# Patient Record
Sex: FEMALE | Race: WHITE
[De-identification: names, ages, dates, MRNs, and addresses within clinical notes are randomized per-mention and may not be internally consistent; named-entity substitution may affect disease eponyms.]

---

## 2021-01-18 ENCOUNTER — HOSPITAL ENCOUNTER (EMERGENCY)
Dept: HOSPITAL 63 - ER | Age: 41
LOS: 1 days | Discharge: HOME | End: 2021-01-19
Payer: SELF-PAY

## 2021-01-18 DIAGNOSIS — Z88.0: ICD-10-CM

## 2021-01-18 DIAGNOSIS — F41.9: ICD-10-CM

## 2021-01-18 DIAGNOSIS — L03.011: Primary | ICD-10-CM

## 2021-01-18 DIAGNOSIS — Z88.6: ICD-10-CM

## 2021-01-18 DIAGNOSIS — F17.200: ICD-10-CM

## 2021-01-18 DIAGNOSIS — F31.9: ICD-10-CM

## 2021-01-18 PROCEDURE — 73140 X-RAY EXAM OF FINGER(S): CPT

## 2021-01-18 PROCEDURE — 99283 EMERGENCY DEPT VISIT LOW MDM: CPT

## 2021-01-18 NOTE — PHYS DOC
Past History


Past Medical History:  Anxiety, Bipolar, Other


Past Surgical History:  Tubal ligation


Smoking:  Less than 1pk/day


Alcohol Use:  None


Drug Use:  None





Adult General


HPI


HPI





Patient is a 40-year-old female who presents with a chief complaint of right 

thumb pain.  States that she jammed her finger yesterday while doing something 

and also got a splinter underneath of her thumbnail.  States she pulled the 

splinter out but since then has had right thumb pain, 7 out of 10, sharp in 

nature since then.  Denies any other injuries.  Denies any fevers, chest pain, 

shortness of breath, nausea, vomiting.





Review of Systems


Review of Systems


Review of systems otherwise unremarkable except noted in HPI





Allergies


Allergies





Allergies








Coded Allergies Type Severity Reaction Last Updated Verified


 


  Penicillins Allergy Intermediate  8/12/15 Yes


 


  tramadol Allergy Intermediate  1/22/14 Yes











Physical Exam


Physical Exam





Constitutional: Well developed, well nourished, no acute distress, non-toxic 

appearance. []


HENT: Normocephalic, atraumatic


Eyes: conjunctiva normal, no discharge. [] 


Neck: Normal range of motion, 


Skin: Warm, dry, no erythema, no rash. [] 


Extremities: Patient has tenderness at the distal tip of the right thumb with a 

small amount of swelling but no erythema or warmth.  Patient has a small amount 

of blood under the tip of the thumbnail.  Range of motion normal.  Capillary 

refill normal.  Gross sensation and range of motion normal.


Neurologic: Alert and oriented X 3, normal motor function, normal sensory 

function, no focal deficits noted. []


Psychologic: Affect normal, judgement normal, mood normal. []





EKG


EKG


[]





Radiology/Procedures


Radiology/Procedures


No acute osseous abnormalities, no foreign bodies or gas noted imaging []





Heart Score


Risk Factors:


Risk Factors:  DM, Current or recent (<one month) smoker, HTN, HLP, family 

history of CAD, obesity.


Risk Scores:


Risk Factors:  DM, Current or recent (<one month) smoker, HTN, HLP, family 

history of CAD, obesity.





Course & Med Decision Making


Course & Med Decision Making


Patient is a 40-year-old female presents with right thumb pain after jamming it 

and getting a splinter under the nail


Vital signs not concerning.  Physical exam noted above.  Patient started on 

clindamycin and given Percocet in the ED as she took ibuprofen before coming to 

the ED.


Patient also requesting information to establish care with a primary care 

physician.  Imaging not concerning.  Discussed all findings with patient and 

recommended a pain regimen at home including her prescription pain medication, 

ibuprofen and ice.  Given primary care physician contact information.  Advised 

to call first thing in the morning to try and establish care and set up a 

follow-up visit in the next week or so for a wound check.  Advised to come back 

to the ED with new or concerning symptoms.  Patient grateful, verbalized 

understanding and agreed with plan of discharge.


[]





Dragon Disclaimer


Dragon Disclaimer


This electronic medical record was generated, in whole or in part, using a voice

 recognition dictation system.





Departure


Departure:


Impression:  


   Primary Impression:  


   Pain of right thumb


   Additional Impression:  


   Cellulitis of right thumb


Condition:  GOOD


Referrals:  


PCP,NO (PCP)


Patient Instructions:  Cellulitis, Easy-to-Read


Scripts


Oxycodone HCl/Acetaminophen (Percocet 5-325 mg Tablet) 1 Each Tablet


1 TAB PO PRN BID PRN for thumb pain MDD 2 Tablet(s) for 3 Days, #6 TAB 0 Refills


   Prov: JM MANZO MD         1/19/21 


Clindamycin Hcl (CLINDAMYCIN HCL) 300 Mg Capsule


1 CAP PO TID for cellulitis for 7 Days, #21 CAP


   Prov: JM MANZO MD         1/19/21





Problem Qualifiers











JM MANZO MD               Jan 18, 2021 23:37

## 2021-01-19 VITALS — SYSTOLIC BLOOD PRESSURE: 113 MMHG | DIASTOLIC BLOOD PRESSURE: 73 MMHG

## 2021-01-19 NOTE — RAD
EXAM:  XR FINGER(S)_RIGHT 2+VIEWS 1/19/2021 12:24 AM



CLINICAL INDICATION:  Right thumb injury



COMPARISON:  None



TECHNIQUE:  3 views of the right hand



FINDINGS:  No acute fracture. Alignment is normal. Joint spaces are maintained. No soft tissue abnorm
ality.



IMPRESSION:  No acute osseous abnormality.



Electronically signed by: Susie Barton MD (1/19/2021 2:40 AM) [id number]

## 2021-04-04 ENCOUNTER — HOSPITAL ENCOUNTER (EMERGENCY)
Dept: HOSPITAL 63 - ER | Age: 41
Discharge: HOME | End: 2021-04-04
Payer: SELF-PAY

## 2021-04-04 VITALS — DIASTOLIC BLOOD PRESSURE: 111 MMHG | SYSTOLIC BLOOD PRESSURE: 183 MMHG

## 2021-04-04 VITALS — HEIGHT: 67 IN | BODY MASS INDEX: 38.06 KG/M2 | WEIGHT: 242.51 LBS

## 2021-04-04 DIAGNOSIS — Z88.0: ICD-10-CM

## 2021-04-04 DIAGNOSIS — Z88.8: ICD-10-CM

## 2021-04-04 DIAGNOSIS — F17.210: ICD-10-CM

## 2021-04-04 DIAGNOSIS — F31.9: ICD-10-CM

## 2021-04-04 DIAGNOSIS — Z87.442: ICD-10-CM

## 2021-04-04 DIAGNOSIS — F15.10: ICD-10-CM

## 2021-04-04 DIAGNOSIS — D72.829: ICD-10-CM

## 2021-04-04 DIAGNOSIS — Z87.440: ICD-10-CM

## 2021-04-04 DIAGNOSIS — L03.116: Primary | ICD-10-CM

## 2021-04-04 DIAGNOSIS — N39.0: ICD-10-CM

## 2021-04-04 DIAGNOSIS — F41.9: ICD-10-CM

## 2021-04-04 DIAGNOSIS — F12.10: ICD-10-CM

## 2021-04-04 LAB
AMPHETAMINE/METHAMPHETAMINE: (no result)
ANION GAP SERPL CALC-SCNC: 8 MMOL/L (ref 6–14)
APTT PPP: YELLOW S
BACTERIA #/AREA URNS HPF: (no result) /HPF
BARBITURATES UR-MCNC: (no result) UG/ML
BASOPHILS # BLD AUTO: 0 X10^3/UL (ref 0–0.2)
BASOPHILS NFR BLD: 0 % (ref 0–3)
BENZODIAZ UR-MCNC: (no result) UG/L
BILIRUB UR QL STRIP: (no result)
CA-I SERPL ISE-MCNC: 14 MG/DL (ref 7–20)
CALCIUM SERPL-MCNC: 8.8 MG/DL (ref 8.5–10.1)
CANNABINOIDS UR-MCNC: (no result) UG/L
CHLORIDE SERPL-SCNC: 105 MMOL/L (ref 98–107)
CO2 SERPL-SCNC: 28 MMOL/L (ref 21–32)
COCAINE UR-MCNC: (no result) NG/ML
CREAT SERPL-MCNC: 1 MG/DL (ref 0.6–1)
EOSINOPHIL NFR BLD: 0.3 X10^3/UL (ref 0–0.7)
EOSINOPHIL NFR BLD: 2 % (ref 0–3)
ERYTHROCYTE [DISTWIDTH] IN BLOOD BY AUTOMATED COUNT: 13.3 % (ref 11.5–14.5)
FIBRINOGEN PPP-MCNC: (no result) MG/DL
GFR SERPLBLD BASED ON 1.73 SQ M-ARVRAT: 62.1 ML/MIN
GLUCOSE SERPL-MCNC: 104 MG/DL (ref 70–99)
GLUCOSE UR STRIP-MCNC: (no result) MG/DL
HCT VFR BLD CALC: 40.1 % (ref 36–47)
HGB BLD-MCNC: 13.3 G/DL (ref 12–15.5)
LYMPHOCYTES # BLD: 1.5 X10^3/UL (ref 1–4.8)
LYMPHOCYTES NFR BLD AUTO: 13 % (ref 24–48)
MCH RBC QN AUTO: 29 PG (ref 25–35)
MCHC RBC AUTO-ENTMCNC: 33 G/DL (ref 31–37)
MCV RBC AUTO: 86 FL (ref 79–100)
METHADONE SERPL-MCNC: (no result) NG/ML
MONO #: 1.1 X10^3/UL (ref 0–1.1)
MONOCYTES NFR BLD: 9 % (ref 0–9)
NEUT #: 8.8 X10^3UL (ref 1.8–7.7)
NEUTROPHILS NFR BLD AUTO: 76 % (ref 31–73)
NITRITE UR QL STRIP: (no result)
OPIATES UR-MCNC: (no result) NG/ML
PCP SERPL-MCNC: (no result) MG/DL
PLATELET # BLD AUTO: 288 X10^3/UL (ref 140–400)
POTASSIUM SERPL-SCNC: 3.6 MMOL/L (ref 3.5–5.1)
RBC # BLD AUTO: 4.65 X10^6/UL (ref 3.5–5.4)
SODIUM SERPL-SCNC: 141 MMOL/L (ref 136–145)
SP GR UR STRIP: >=1.03
SQUAMOUS #/AREA URNS LPF: (no result) /LPF
UROBILINOGEN UR-MCNC: 0.2 MG/DL
WBC # BLD AUTO: 11.7 X10^3/UL (ref 4–11)
WBC #/AREA URNS HPF: >40 /HPF (ref 0–4)

## 2021-04-04 PROCEDURE — 81001 URINALYSIS AUTO W/SCOPE: CPT

## 2021-04-04 PROCEDURE — 87040 BLOOD CULTURE FOR BACTERIA: CPT

## 2021-04-04 PROCEDURE — 96365 THER/PROPH/DIAG IV INF INIT: CPT

## 2021-04-04 PROCEDURE — 87591 N.GONORRHOEAE DNA AMP PROB: CPT

## 2021-04-04 PROCEDURE — 85025 COMPLETE CBC W/AUTO DIFF WBC: CPT

## 2021-04-04 PROCEDURE — 81025 URINE PREGNANCY TEST: CPT

## 2021-04-04 PROCEDURE — 80048 BASIC METABOLIC PNL TOTAL CA: CPT

## 2021-04-04 PROCEDURE — 80307 DRUG TEST PRSMV CHEM ANLYZR: CPT

## 2021-04-04 PROCEDURE — 99284 EMERGENCY DEPT VISIT MOD MDM: CPT

## 2021-04-04 PROCEDURE — 87086 URINE CULTURE/COLONY COUNT: CPT

## 2021-04-04 PROCEDURE — 87491 CHLMYD TRACH DNA AMP PROBE: CPT

## 2021-04-04 PROCEDURE — 90715 TDAP VACCINE 7 YRS/> IM: CPT

## 2021-04-04 PROCEDURE — 90471 IMMUNIZATION ADMIN: CPT

## 2021-04-04 PROCEDURE — 36415 COLL VENOUS BLD VENIPUNCTURE: CPT

## 2021-04-04 PROCEDURE — 96375 TX/PRO/DX INJ NEW DRUG ADDON: CPT

## 2021-04-04 NOTE — PHYS DOC
Past History


Past Medical History:  Anxiety, Bipolar, Kidney Stones, STD, UTI


Past Medical History


cellulitis


Past Surgical History:  , Tubal ligation


Past Surgical History


Endometrial ablation


Smoking:  Less than 1pk/day


Alcohol Use:  None


Drug Use:  None





General Adult


HPI:


HPI:


".. I was going up a down escalator.  And scraped his right shin pretty good 

about 4 days ago.... And in the last 24 hours it 's got really infected  ... Red

and swollen... I also have a little bit of a urinary dysuria... And a bad 

fractured tooth here on the right lower.... I usually see Dand[facility].. but this leg 

has gotten very swollen and painful looks like the scrapes areas are starting to

drain.."...





Patient is a 40 year old female who presents with above hx and complaints 

cellulitis of right shin-tibia.  Patient has 4 to centimeter cut marks that are 

inflamed and starting to drain.  Patient has had MRSA before.  Patient has no 

current striation but there is obvious erythema at site of 

abrasions/lacerations.  Patient denies any history of immunosuppression.  No 

history of recent travel.  No specific ill contacts.  Patient has subjective 

complaints also of dysuria.  Patient also fractured molar tooth #31 and having 

continued pain at the site.  Patient has past medical history of several ED 

visits.  For pain complaints.  Past medical history of anxiety, depression, 

endometriosis, bipolar disorder, GERD, MRSA, cellulitis, and STDs.  Patient 

currently requesting antibiotics until she can follow-up with primary care.  

Patient does continue to smoke approximately a pack a day.





Review of Systems:


Review of Systems:


Constitutional:  Denies fever or chills 


Eyes:  Denies change in visual acuity 


HENT:  Denies nasal congestion or sore throat 


Respiratory:  Denies cough or shortness of breath 


Cardiovascular:  Denies chest pain or edema 


GI:  Denies abdominal pain, nausea, vomiting, bloody stools or diarrhea 


: Denies dysuria 


Musculoskeletal:  Denies back pain or joint pain 


Integument:  Denies rash-complains of cellulitis at abrasion site right leg


Neurologic:  Denies headache, focal weakness or sensory changes 


Endocrine:  Denies polyuria or polydipsia 


Lymphatic:  Denies swollen glands 


Psychiatric: History of depression or anxiety





Family History:


Family History:


Noncontributory to presentation





Current Medications:


Current Meds:


See nursing for home meds





Allergies:


Allergies:





Allergies








Coded Allergies Type Severity Reaction Last Updated Verified


 


  Penicillins Allergy Intermediate  8/12/15 Yes


 


  tramadol Allergy Intermediate  14 Yes











Physical Exam:


PE:





Constitutional: In acute distress, non-toxic appearance. []


HENT: Normocephalic, atraumatic, bilateral external ears normal, oropharynx 

moist, no oral exudates, nose normal. []


Eyes: PERRLA, EOMI, conjunctiva normal, no discharge. [] 


Neck: Normal range of motion, no tenderness, supple, no stridor. [] 


Cardiovascular:Heart rate regular rhythm, no murmur []


Lungs & Thorax:  Bilateral breath sounds equal apex with scattered wheezes on 

auscultation []


Abdomen: Bowel sounds normal, soft, no tenderness, no masses, no pulsatile 

masses.  Obese.  Old  surgery scar


Skin: Warm, dry, no erythema, right anterior tibia area abrasion and cellulitis


Back: No tenderness, no CVA tenderness. [] 


Extremities: No tenderness, no cyanosis, no clubbing, ROM intact, right lower 

leg edema. [] 


Neurologic: Alert and oriented X 3, normal motor function, normal sensory 

function, no focal deficits noted. []


Psychologic: Affect n anxious, judgement normal, mood normal. []





EKG:


EKG:


[]





Radiology/Procedures:


Radiology/Procedures:


[]





Heart Score:


C/O Chest Pain:  N/A


Risk Factors:


Risk Factors:  DM, Current or recent (<one month) smoker, HTN, HLP, family 

history of CAD, obesity.


Risk Scores:


Score 0 - 3:  2.5% MACE over next 6 weeks - Discharge Home


Score 4 - 6:  20.3% MACE over next 6 weeks - Admit for Clinical Observation


Score 7 - 10:  72.7% MACE over next 6 weeks - Early Invasive Strategies





Course & Med Decision Making:


Course & Med Decision Making


Pertinent Labs and Imaging studies reviewed. (See chart for details)





Patient currently declines admission for IV antibiotics.  Patient given dose of 

Rocephin 1 g and then started on Bactrim DS to take twice a day.  Patient to use

 warm compresses with salt water and Epson salts 4 times a day to the area of 

cellulitis.  After compresses massage in Polysporin.  Patient monitor site 

closely for any striations or signs of excess increase infection.  Patient 

follow-up pending lab culture.  Patient to push vitamin C drinks.  Patient 

follow-up primary care.











Impression:





1.  Cellulitis right lower leg


2.  Continued tobacco use


3.  Mild leukocytosis 11.7


4.  Drug screen positive for marijuana and amphetamines


5.  Urinary tract infection








[]





Dragon Disclaimer:


Dragon Disclaimer:


This electronic medical record was generated, in whole or in part, using a voice

 recognition dictation system.





Departure


Departure:


Referrals:  


PCP,JAZLYN (PCP)


Scripts


Sulfamethoxazole/Trimethoprim (BACTRIM DS TABLET) 1 Each Tablet


1 TAB PO BID for cellulitis for 10 Days, #20 TAB 0 Refills


   Prov: ANJUM SONG MD         21





Dragon Disclaimer


This chart was dictated in whole or in part using Voice Recognition software in 

a busy, high-work load, and often noisy Emergency Department environment.  It 

may contain unintended and wholly unrecognized errors or omissions.











ANJUM SONG MD            2021 00:48

## 2021-04-05 ENCOUNTER — HOSPITAL ENCOUNTER (INPATIENT)
Dept: HOSPITAL 63 - ER | Age: 41
LOS: 2 days | Discharge: LEFT BEFORE DISCHARGE | DRG: 603 | End: 2021-04-07
Attending: HOSPITALIST | Admitting: HOSPITALIST
Payer: SELF-PAY

## 2021-04-05 VITALS — HEIGHT: 67 IN | WEIGHT: 242.51 LBS | BODY MASS INDEX: 38.06 KG/M2

## 2021-04-05 DIAGNOSIS — F41.1: ICD-10-CM

## 2021-04-05 DIAGNOSIS — Z53.29: ICD-10-CM
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
## 2021-04-05 DIAGNOSIS — Z87.442: ICD-10-CM

## 2021-04-05 DIAGNOSIS — F17.210: ICD-10-CM

## 2021-04-05 DIAGNOSIS — Z98.51: ICD-10-CM

## 2021-04-05 DIAGNOSIS — L03.115: Primary | ICD-10-CM
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
## 2021-04-05 DIAGNOSIS — B95.62: ICD-10-CM
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
## 2021-04-05 DIAGNOSIS — F31.9: ICD-10-CM
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
## 2021-04-05 DIAGNOSIS — Z83.3: ICD-10-CM

## 2021-04-05 PROCEDURE — 80048 BASIC METABOLIC PNL TOTAL CA: CPT

## 2021-04-05 PROCEDURE — 36415 COLL VENOUS BLD VENIPUNCTURE: CPT

## 2021-04-05 PROCEDURE — 85025 COMPLETE CBC W/AUTO DIFF WBC: CPT

## 2021-04-05 PROCEDURE — 96365 THER/PROPH/DIAG IV INF INIT: CPT

## 2021-04-05 PROCEDURE — 86140 C-REACTIVE PROTEIN: CPT

## 2021-04-05 PROCEDURE — 73590 X-RAY EXAM OF LOWER LEG: CPT

## 2021-04-05 PROCEDURE — 83605 ASSAY OF LACTIC ACID: CPT

## 2021-04-05 PROCEDURE — 82565 ASSAY OF CREATININE: CPT

## 2021-04-06 VITALS — DIASTOLIC BLOOD PRESSURE: 70 MMHG | SYSTOLIC BLOOD PRESSURE: 114 MMHG

## 2021-04-06 VITALS — DIASTOLIC BLOOD PRESSURE: 80 MMHG | SYSTOLIC BLOOD PRESSURE: 123 MMHG

## 2021-04-06 VITALS — SYSTOLIC BLOOD PRESSURE: 101 MMHG | DIASTOLIC BLOOD PRESSURE: 64 MMHG

## 2021-04-06 VITALS — DIASTOLIC BLOOD PRESSURE: 64 MMHG | SYSTOLIC BLOOD PRESSURE: 99 MMHG

## 2021-04-06 LAB
ANION GAP SERPL CALC-SCNC: 9 MMOL/L (ref 6–14)
BASOPHILS # BLD AUTO: 0 X10^3/UL (ref 0–0.2)
BASOPHILS NFR BLD: 0 % (ref 0–3)
CA-I SERPL ISE-MCNC: 10 MG/DL (ref 7–20)
CALCIUM SERPL-MCNC: 8.7 MG/DL (ref 8.5–10.1)
CHLORIDE SERPL-SCNC: 102 MMOL/L (ref 98–107)
CO2 SERPL-SCNC: 26 MMOL/L (ref 21–32)
CREAT SERPL-MCNC: 1.1 MG/DL (ref 0.6–1)
CRP SERPL-MCNC: 152.8 MG/L (ref 0–3.3)
EOSINOPHIL NFR BLD: 0.1 X10^3/UL (ref 0–0.7)
EOSINOPHIL NFR BLD: 1 % (ref 0–3)
ERYTHROCYTE [DISTWIDTH] IN BLOOD BY AUTOMATED COUNT: 13.4 % (ref 11.5–14.5)
GFR SERPLBLD BASED ON 1.73 SQ M-ARVRAT: 55.6 ML/MIN
GLUCOSE SERPL-MCNC: 104 MG/DL (ref 70–99)
HCT VFR BLD CALC: 37.1 % (ref 36–47)
HGB BLD-MCNC: 12.4 G/DL (ref 12–15.5)
LYMPHOCYTES # BLD: 1.2 X10^3/UL (ref 1–4.8)
LYMPHOCYTES NFR BLD AUTO: 9 % (ref 24–48)
MCH RBC QN AUTO: 29 PG (ref 25–35)
MCHC RBC AUTO-ENTMCNC: 33 G/DL (ref 31–37)
MCV RBC AUTO: 87 FL (ref 79–100)
MONO #: 1.2 X10^3/UL (ref 0–1.1)
MONOCYTES NFR BLD: 9 % (ref 0–9)
NEUT #: 10.9 X10^3UL (ref 1.8–7.7)
NEUTROPHILS NFR BLD AUTO: 81 % (ref 31–73)
PLATELET # BLD AUTO: 286 X10^3/UL (ref 140–400)
POTASSIUM SERPL-SCNC: 3.5 MMOL/L (ref 3.5–5.1)
RBC # BLD AUTO: 4.27 X10^6/UL (ref 3.5–5.4)
SODIUM SERPL-SCNC: 137 MMOL/L (ref 136–145)
WBC # BLD AUTO: 13.5 X10^3/UL (ref 4–11)

## 2021-04-06 RX ADMIN — MORPHINE SULFATE PRN MG: 4 INJECTION, SOLUTION INTRAMUSCULAR; INTRAVENOUS at 16:53

## 2021-04-06 RX ADMIN — VANCOMYCIN HYDROCHLORIDE SCH MLS/HR: 1 INJECTION, POWDER, LYOPHILIZED, FOR SOLUTION INTRAVENOUS at 16:52

## 2021-04-06 RX ADMIN — MORPHINE SULFATE PRN MG: 4 INJECTION, SOLUTION INTRAMUSCULAR; INTRAVENOUS at 12:36

## 2021-04-06 RX ADMIN — MORPHINE SULFATE PRN MG: 4 INJECTION, SOLUTION INTRAMUSCULAR; INTRAVENOUS at 20:53

## 2021-04-06 NOTE — PHYS DOC
Past History


Past Medical History:  Anxiety, Bipolar, Kidney Stones, STD, UTI


Additional Past Medical Histor:  patellar/femoral syndrome


Past Surgical History:  , Tubal ligation


Additional Past Surgical Histo:  ablation


Smoking:  Less than 1pk/day


Alcohol Use:  None


Drug Use:  None





Adult General


Chief Complaint


Chief Complaint:  LOWER EXT PAIN





HPI


HPI


Patient is a 40-year-old female who presents with right lower extremity pain and

infection.  States that little over a week ago she tripped and fell on an 

escalator and scraped the front of her shin.  States of the next few days her 

leg became infected.  States she came into the emergency department and was 

given antibiotics.  States that it did not seem to help, and redness and 

swelling have gotten worse.  Denies headache, fevers, neck pain, chest pain, 

shortness of breath, abdominal pain, nausea, vomiting.  States that the pain is 

approximately 5 out of 10, dull and achy in nature with no radiation.  States 

she is still able to ambulate but it causes her discomfort.





Review of Systems


Review of Systems


Review of systems otherwise unremarkable except noted in HPI





Allergies


Allergies





Allergies








Coded Allergies Type Severity Reaction Last Updated Verified


 


  Penicillins Allergy Intermediate hives 21 Yes


 


  tramadol Allergy Intermediate  14 Yes











Physical Exam


Physical Exam





Constitutional: Well developed, well nourished, no acute distress, non-toxic 

appearance. []


HENT: Normocephalic, atraumatic, bilateral external ears normal, oropharynx 

moist, no oral exudates, nose normal. []


Neck: Normal range of motion, no tenderness, 


Cardiovascular: Sinus tachycardia


Lungs & Thorax:  Bilateral breath sounds clear to auscultation []


Abdomen:  soft, no tenderness, no masses, no pulsatile masses. [] 


Extremities: Significant circumferential redness, swelling, tenderness, 

tenderness to palpation on right lower extremity with multiple abrasions.


Neurologic: Alert and oriented X 3, normal motor function, normal sensory 

function, no focal deficits noted. []


Psychologic: Affect normal, judgement normal, mood normal. []





Current Patient Data


Vital Signs





                                   Vital Signs








  Date Time  Temp Pulse Resp B/P (MAP) Pulse Ox O2 Delivery O2 Flow Rate FiO2


 


21 23:20 99.8 106 20 125/76 (92) 98 Room Air  











EKG


EKG


[]





Radiology/Procedures


Radiology/Procedures


[]





Heart Score


C/O Chest Pain:  No


Risk Factors:


Risk Factors:  DM, Current or recent (<one month) smoker, HTN, HLP, family 

history of CAD, obesity.


Risk Scores:


Risk Factors:  DM, Current or recent (<one month) smoker, HTN, HLP, family 

history of CAD, obesity.





Course & Med Decision Making


Course & Med Decision Making


Patient is a 40-year-old female who presents with right lower extremity 

swelling, pain and infection


Vital signs notable for tachycardia.  Physical exam noted above.  Patient 

started on antibiotics in the ED.  Given pain medication while in the ED.


Laboratory analysis notable for leukocytosis and elevated CRP.  Discussed 

findings with patient and recommended admission for continued evaluation, 

treatment for her cellulitis.


Patient grateful, verbalized understanding and agreed with plan of admission.


[]





Dragon Disclaimer


Dragon Disclaimer


This electronic medical record was generated, in whole or in part, using a voice

 recognition dictation system.





Departure


Departure:


Impression:  


   Primary Impression:  


   Cellulitis


Disposition:   ADMITTED AS INPT THIS [facility]


Admitting Physician:  Mike Leyva


Condition:  IMPROVED


Referrals:  


PCP,JAZLYN (PCP)











JM MANZO MD                2021 00:29

## 2021-04-06 NOTE — HP
ADMIT DATE:  04/06/2021



ATTENDING PHYSICIAN:  Dr. Chan.



CHIEF COMPLAINT:  Right leg pain.



HISTORY OF PRESENT ILLNESS:  The patient is a 40-year-old female with a right

leg injury from 4 days ago, she had fallen on an escalator.  The skin was

broken.  She was seen in the ED, she was prescribed Bactrim only. 

Unfortunately, this was not effective and swelling, erythema persisted, minimal

drainage noted.  There is no obvious abscess.  She was given clindamycin and

vancomycin in the ED last night, the ER physician called me for admission.  I

have continued the vancomycin.



The patient states it is very painful, hurts to bear weight, no fevers or

chills.  She is admitted then for inpatient IV antibiotics.



PAST MEDICAL HISTORY:  Significant for remote history of bipolar disorder,

generalized anxiety, kidney stones, urinary tract infection.  She has had a

previous tubal ligation.  She also has so-called patellofemoral syndrome.



ALLERGIES:  SHE HAS ALLERGIES TO PENICILLIN AND TRAMADOL.



CURRENT MEDICATIONS:  Only p.r.n. meds.  She had been on Seroquel.  This has

been discontinued.  She takes some Xanax occasionally.



FAMILY HISTORY:  Both parents are alive at age 61.  Mom has diabetes, adult

onset.



SOCIAL HISTORY:  She is a smoker, half pack of cigarettes a day.  No alcohol

use.  She is employed as a dispatcher for a local cab company.



REVIEW OF SYSTEMS:  Significant for the localized pain, hurts to bear weight. 

No fevers, chills, some nausea, but no vomiting.  All other systems reviewed and

turned to be negative.



PHYSICAL EXAMINATION:

GENERAL:  When I saw her, this is a pleasant young female.

INITIAL VITAL SIGNS:  Showed a blood pressure 114/70, pulse is 83 and regular,

temperature 98.2 degrees Fahrenheit, and her oxygen saturation is 98% on room

air.

HEENT:  Head is without trauma.  Pupils are reactive.  Sclerae nonicteric.  The

oropharynx is clear.

NECK:  Supple, no bruits identified.

LUNGS:  Clear to auscultation.

CARDIOVASCULAR:  Showed regular heart tones.  No gallops.

ABDOMEN:  Soft.

EXTREMITIES:  Showed no cyanosis.  The right anterior shin is red, swollen,

edematous.  There are no palpable cords.  I did draw a line of demarcation above

the knee and distally towards the ankle.  There are some open wounds that have

healed.  There is no obvious pus or drainage identified.

SKIN:  Otherwise warm and dry.

NEUROLOGIC:  Focally intact.  Speech is fluent.  No deficits.



PERTINENT LABORATORY AND X-RAY STUDIES:  She had x-rays, plain films of the leg,

which showed no gas gangrene.  There is documented swelling, no bony fractures

identified.  The hemoglobin was 12.4 g/dL with a white count of 13,500. 

Electrolytes within normal range.  Nonfasting blood sugar 104.  Creatinine is

1.1 mg percent.



ASSESSMENT:

1.  A 40-year-old female with cellulitis of the right anterior shin secondary to

a fall.  The skin has been broken, most likely this is an methicillin-resistant

Staphylococcus aureus infection.

2.  Associated pain.

3.  Smoking history as noted.



PLAN:

1.  IV vancomycin has been ordered twice a day.

2.  Morphine p.r.n. pain.

3.  Diet as tolerated.

4.  Home meds were reviewed.





______________________________

MARIA T CHAN MD



DR:  ANGELIKA/ping  JOB#:  [id number] / [id number]

DD:  04/06/2021 09:17  DT:  04/06/2021 09:31

## 2021-04-06 NOTE — RAD
XR RT TIBIA+FIBULA 



History: Reason: Fall trauma 1 week ago, right lower leg pain, swelling redness / Spl. Instructions: 
 / History: 



Technique: 2 views right tibia and fibula.



Comparison: None.



Findings:

Normal alignment. No fracture. Right lower extremity soft tissue edema. Mild knee DJD. Plantar calcan
eal spur.



Impression: 

1.  No acute osseous abnormality.



Electronically signed by: Yannick Ortega DO (4/6/2021 12:48 AM) [facility]

## 2021-04-07 VITALS — DIASTOLIC BLOOD PRESSURE: 67 MMHG | SYSTOLIC BLOOD PRESSURE: 123 MMHG

## 2021-04-07 LAB
CREAT SERPL-MCNC: 0.9 MG/DL (ref 0.6–1)
GFR SERPLBLD BASED ON 1.73 SQ M-ARVRAT: 70.1 ML/MIN

## 2021-04-07 RX ADMIN — VANCOMYCIN HYDROCHLORIDE SCH MLS/HR: 1 INJECTION, POWDER, LYOPHILIZED, FOR SOLUTION INTRAVENOUS at 04:18

## 2021-04-07 RX ADMIN — MORPHINE SULFATE PRN MG: 4 INJECTION, SOLUTION INTRAMUSCULAR; INTRAVENOUS at 08:52

## 2021-04-07 RX ADMIN — MORPHINE SULFATE PRN MG: 4 INJECTION, SOLUTION INTRAMUSCULAR; INTRAVENOUS at 04:19
